# Patient Record
(demographics unavailable — no encounter records)

---

## 2025-01-17 NOTE — ASSESSMENT
[FreeTextEntry1] : =============================================================== HTN  Hyperlipidemia  Mild coronary a. calcification seen on chest CT in 9/2023; negative stress isotope study Dec. 2020.

## 2025-01-17 NOTE — PHYSICAL EXAM
[General Appearance - Well Developed] : well developed [Normal Appearance] : normal appearance [Well Groomed] : well groomed [General Appearance - Well Nourished] : well nourished [General Appearance - In No Acute Distress] : no acute distress [Normal Conjunctiva] : the conjunctiva exhibited no abnormalities [Eyelids - No Xanthelasma] : the eyelids demonstrated no xanthelasmas [Normal Jugular Venous A Waves Present] : normal jugular venous A waves present [Normal Jugular Venous V Waves Present] : normal jugular venous V waves present [No Jugular Venous Sanders A Waves] : no jugular venous sanders A waves [Respiration, Rhythm And Depth] : normal respiratory rhythm and effort [Auscultation Breath Sounds / Voice Sounds] : lungs were clear to auscultation bilaterally [Heart Rate And Rhythm] : heart rate and rhythm were normal [Bowel Sounds] : normal bowel sounds [Abnormal Walk] : normal gait [Gait - Sufficient For Exercise Testing] : the gait was sufficient for exercise testing [Nail Clubbing] : no clubbing of the fingernails [Cyanosis, Localized] : no localized cyanosis [Skin Color & Pigmentation] : normal skin color and pigmentation [] : no rash [Oriented To Time, Place, And Person] : oriented to person, place, and time [Impaired Insight] : insight and judgment were intact [Affect] : the affect was normal [Mood] : the mood was normal [FreeTextEntry1] : No edema. 2+ pulses in the upper and lower extremities.

## 2025-01-17 NOTE — HISTORY OF PRESENT ILLNESS
[FreeTextEntry1] : Since I last saw her, she remains well from a cardiac standpoint.  She reports no episodes of exertional chest discomfort or dyspnea. There have been no palpitations, and no episodes of lightheadedness or loss of consciousness. There have been no episodes of orthopnea or PND.   Medications are unchanged.  There have been no new interval medical problems.

## 2025-01-17 NOTE — DISCUSSION/SUMMARY
[FreeTextEntry1] : EKG today shows:  Sinus Rhythm at 81 bpm.  Normal intervals and axis.  Unremarkable tracing.   Echo today shows: 1. The left atrium is mildly dilated. 2. Left ventricular systolic function is normal with an ejection fraction of 59 % by Snyder's method of disks. 3. RV systolic function is normal. 4. Very mild mitral regurgitation. 5. No significant change compared to an echocardiogram donein Sept. 2022.  PLAN: 1.  Mild coronary a. calcification seen on chest CT in Sept. 2023; stress isotope study in 2020 showed no ischemia.  She remains free of ischemic symptoms with her current activities.  2.  HTN - BP in reaonable range today. - continue losartan/HCT.  - continue low salt diet. - weight loss.  She filled a Rx for Mounjaro but has been hesitant to start it; I advised her that she should.  3.  HLD - continue current dose of statin  4.  LE edema - not troublesome of late.  31 minutes spent on today's office visit.   She will return for a visit in 6 mos.   [EKG obtained to assist in diagnosis and management of assessed problem(s)] : EKG obtained to assist in diagnosis and management of assessed problem(s)

## 2025-07-15 NOTE — REVIEW OF SYSTEMS
[Recent Change In Weight] : ~T recent weight change [Vision Problems] : vision problems [Hearing Loss] : hearing loss [Dyspnea on Exertion] : dyspnea on exertion [Heartburn] : heartburn [Joint Pain] : joint pain [Back Pain] : back pain [Dizziness] : dizziness [Unsteady Walking] : ataxia [Negative] : Heme/Lymph

## 2025-07-15 NOTE — PHYSICAL EXAM
[No Acute Distress] : no acute distress [Well-Appearing] : well-appearing [Normal Voice/Communication] : normal voice/communication [Normal Sclera/Conjunctiva] : normal sclera/conjunctiva [PERRL] : pupils equal round and reactive to light [EOMI] : extraocular movements intact [Normal Outer Ear/Nose] : the outer ears and nose were normal in appearance [Normal Oropharynx] : the oropharynx was normal [Normal TMs] : both tympanic membranes were normal [No JVD] : no jugular venous distention [Supple] : supple [No Respiratory Distress] : no respiratory distress  [No Accessory Muscle Use] : no accessory muscle use [Clear to Auscultation] : lungs were clear to auscultation bilaterally [Normal Rate] : normal rate  [Regular Rhythm] : with a regular rhythm [Normal S1, S2] : normal S1 and S2 [No Carotid Bruits] : no carotid bruits [No Edema] : there was no peripheral edema [No Extremity Clubbing/Cyanosis] : no extremity clubbing/cyanosis [Declined Breast Exam] : declined breast exam  [Soft] : abdomen soft [Non-distended] : non-distended [Normal Bowel Sounds] : normal bowel sounds [Declined Rectal Exam] : declined rectal exam [No CVA Tenderness] : no CVA  tenderness [No Spinal Tenderness] : no spinal tenderness [Grossly Normal Strength/Tone] : grossly normal strength/tone [No Rash] : no rash [Coordination Grossly Intact] : coordination grossly intact [No Focal Deficits] : no focal deficits [Normal Gait] : normal gait [Speech Grossly Normal] : speech grossly normal [Memory Grossly Normal] : memory grossly normal [Normal Affect] : the affect was normal [Alert and Oriented x3] : oriented to person, place, and time [Normal Mood] : the mood was normal [Normal Insight/Judgement] : insight and judgment were intact [de-identified] : overweight [de-identified] : No ST [de-identified] : No stridor  [de-identified] : R=16 [de-identified] : normal radial pulses; no cords [de-identified] : As per GYN = declined [de-identified] : tanned

## 2025-07-15 NOTE — PHYSICAL EXAM
[No Acute Distress] : no acute distress [Well-Appearing] : well-appearing [Normal Voice/Communication] : normal voice/communication [Normal Sclera/Conjunctiva] : normal sclera/conjunctiva [PERRL] : pupils equal round and reactive to light [EOMI] : extraocular movements intact [Normal Outer Ear/Nose] : the outer ears and nose were normal in appearance [Normal Oropharynx] : the oropharynx was normal [Normal TMs] : both tympanic membranes were normal [No JVD] : no jugular venous distention [Supple] : supple [No Respiratory Distress] : no respiratory distress  [No Accessory Muscle Use] : no accessory muscle use [Clear to Auscultation] : lungs were clear to auscultation bilaterally [Normal Rate] : normal rate  [Regular Rhythm] : with a regular rhythm [Normal S1, S2] : normal S1 and S2 [No Carotid Bruits] : no carotid bruits [No Edema] : there was no peripheral edema [No Extremity Clubbing/Cyanosis] : no extremity clubbing/cyanosis [Declined Breast Exam] : declined breast exam  [Soft] : abdomen soft [Non-distended] : non-distended [Normal Bowel Sounds] : normal bowel sounds [Declined Rectal Exam] : declined rectal exam [No CVA Tenderness] : no CVA  tenderness [No Spinal Tenderness] : no spinal tenderness [Grossly Normal Strength/Tone] : grossly normal strength/tone [No Rash] : no rash [Coordination Grossly Intact] : coordination grossly intact [No Focal Deficits] : no focal deficits [Normal Gait] : normal gait [Speech Grossly Normal] : speech grossly normal [Memory Grossly Normal] : memory grossly normal [Normal Affect] : the affect was normal [Alert and Oriented x3] : oriented to person, place, and time [Normal Mood] : the mood was normal [Normal Insight/Judgement] : insight and judgment were intact [de-identified] : overweight [de-identified] : No ST [de-identified] : No stridor  [de-identified] : R=16 [de-identified] : normal radial pulses; no cords [de-identified] : As per GYN = declined [de-identified] : tanned

## 2025-07-15 NOTE — HISTORY OF PRESENT ILLNESS
[Coronary Artery Disease] : coronary artery disease [Sleep Apnea] : sleep apnea [No Adverse Anesthesia Reaction] : no adverse anesthesia reaction in self or family member [Diabetes] : diabetes [(Patient denies any chest pain, claudication, dyspnea on exertion, orthopnea, palpitations or syncope)] : Patient denies any chest pain, claudication, dyspnea on exertion, orthopnea, palpitations or syncope [Unable to assess] : unable to assess [Aortic Stenosis] : no aortic stenosis [Atrial Fibrillation] : no atrial fibrillation [Recent Myocardial Infarction] : no recent myocardial infarction [Implantable Device/Pacemaker] : no implantable device/pacemaker [Asthma] : no asthma [COPD] : no COPD [Smoker] : not a smoker [Family Member] : no family member with adverse anesthesia reaction/sudden death [Self] : no previous adverse anesthesia reaction [Chronic Anticoagulation] : no chronic anticoagulation [Chronic Kidney Disease] : no chronic kidney disease [FreeTextEntry1] : left cataract surgery [FreeTextEntry2] : 8/19/25 [FreeTextEntry3] : Dr. Chavarria [FreeTextEntry4] : Comes in for medical clearance for left cataract surgery. [FreeTextEntry7] : EKG ECHO STRESS Testing

## 2025-07-15 NOTE — ASSESSMENT
[Patient Optimized for Surgery] : Patient optimized for surgery [ECG] : ECG [Cardiology consultation] : Cardiology consultation [Modify medications prior to procedure] : Modify medications prior to procedure [As per surgery] : as per surgery [FreeTextEntry4] : Medically cleared for left cataract surgery. Seeing her cardiologist who will provide separate cardiac clearance and EKG. [FreeTextEntry7] : Hold Mounjaro as directed

## 2025-07-20 NOTE — CARDIOLOGY SUMMARY
[de-identified] : 1/17/25 Echo today shows: 1. The left atrium is mildly dilated. 2. Left ventricular systolic function is normal with an ejection fraction of 59 % by Snyder's method of disks. 3. RV systolic function is normal. 4. Very mild mitral regurgitation. 5. No significant change compared to an echocardiogram donein Sept. 2022.

## 2025-07-20 NOTE — DISCUSSION/SUMMARY
[EKG obtained to assist in diagnosis and management of assessed problem(s)] : EKG obtained to assist in diagnosis and management of assessed problem(s) [FreeTextEntry1] : EKG today shows: Sinus Rhythm a 75 bpm.  Normal intervals and axis.  PRWP; no significant change.  PLAN: 1.  There is no cardiac contraindication to cataract surgery as planned.   2.  Mild coronary a. calcification seen on chest CT in Sept. 2023; stress isotope study in 2020 showed no ischemia.  Jyothi remains free of ischemic symptoms with her current activities.        She will arrange a cardiac CT angiogram at her convenience in the next few months.  3.  HTN - BP in normal range today. - continue losartan/HCT.  - continue low salt diet. - continue weight loss (decrease of  27 lbs. since January.  4.  HLD - continue statin  5.  LE edema - not troublesome of late.  41 minutes spent on today's office visit.   Will forward pertinent records to Dr. Chavarria.  Jyothi will return for a visit in 6 mos.

## 2025-07-20 NOTE — CARDIOLOGY SUMMARY
[de-identified] : 1/17/25 Echo today shows: 1. The left atrium is mildly dilated. 2. Left ventricular systolic function is normal with an ejection fraction of 59 % by Snyder's method of disks. 3. RV systolic function is normal. 4. Very mild mitral regurgitation. 5. No significant change compared to an echocardiogram donein Sept. 2022.

## 2025-07-20 NOTE — HISTORY OF PRESENT ILLNESS
[FreeTextEntry1] : 1/17/25 Since I last saw her, she remains well from a cardiac standpoint.  She reports no episodes of exertional chest discomfort or dyspnea. There have been no palpitations, and no episodes of lightheadedness or loss of consciousness. There have been no episodes of orthopnea or PND.  Medications are unchanged.  There have been no new interval medical problems.   7/18/25 Jyothi remains active and well.  She describes no episodes of exertional chest discomfort or significant dyspnea.  There have been no palpitations.  She reports no episodes of lightheadedness/LOC. There have been no episodes of orthopnea or PND. Medications are unchanged. She is scheduled for cataract surgery by Dr. Medardo Chavarria in August.

## 2025-07-20 NOTE — REASON FOR VISIT
[FreeTextEntry1] :   Jyothi Rios returns for f/u regarding HTN, HLD and hx. of atypical CP.   She is scheduled for cataract surgery with Dr. Medardo Chavarria next month.